# Patient Record
Sex: FEMALE | Race: WHITE | Employment: OTHER | URBAN - METROPOLITAN AREA
[De-identification: names, ages, dates, MRNs, and addresses within clinical notes are randomized per-mention and may not be internally consistent; named-entity substitution may affect disease eponyms.]

---

## 2023-12-28 ENCOUNTER — CLINICAL SUPPORT (OUTPATIENT)
Dept: AUDIOLOGY | Facility: CLINIC | Age: 83
End: 2023-12-28
Payer: MEDICARE

## 2023-12-28 ENCOUNTER — OFFICE VISIT (OUTPATIENT)
Dept: OTOLARYNGOLOGY | Facility: CLINIC | Age: 83
End: 2023-12-28
Payer: MEDICARE

## 2023-12-28 VITALS — BODY MASS INDEX: 31.55 KG/M2 | WEIGHT: 167 LBS

## 2023-12-28 DIAGNOSIS — H90.3 BILATERAL SENSORINEURAL HEARING LOSS: Primary | ICD-10-CM

## 2023-12-28 DIAGNOSIS — H91.13 PRESBYCUSIS OF BOTH EARS: ICD-10-CM

## 2023-12-28 DIAGNOSIS — H90.3 SENSORINEURAL HEARING LOSS, BILATERAL: Primary | ICD-10-CM

## 2023-12-28 PROCEDURE — 1160F RVW MEDS BY RX/DR IN RCRD: CPT | Performed by: OTOLARYNGOLOGY

## 2023-12-28 PROCEDURE — 1159F MED LIST DOCD IN RCRD: CPT | Performed by: OTOLARYNGOLOGY

## 2023-12-28 PROCEDURE — 1036F TOBACCO NON-USER: CPT | Performed by: OTOLARYNGOLOGY

## 2023-12-28 PROCEDURE — 99203 OFFICE O/P NEW LOW 30 MIN: CPT | Performed by: OTOLARYNGOLOGY

## 2023-12-28 PROCEDURE — 92567 TYMPANOMETRY: CPT | Performed by: AUDIOLOGIST

## 2023-12-28 PROCEDURE — 92557 COMPREHENSIVE HEARING TEST: CPT | Performed by: AUDIOLOGIST

## 2023-12-28 ASSESSMENT — ENCOUNTER SYMPTOMS: DEPRESSION: 0

## 2023-12-28 NOTE — PROGRESS NOTES
"AUDIOMETRIC EVALUATION       Name:  Giselle Thomas  :  1940  Age:  83 y.o.  Date of Evaluation:  2023     HISTORY  Giselle Thomas was seen today for a hearing evaluation due to occasional feeling of fluid in the left ear and occasional heart beat when she lies her head down. She had a hearing evaluation in  that indicated bilateral sensorineural hearing loss (see audiogram). Denies change in hearing or hearing difficulties.    Recall from 2020: She reports evaluation by Dr. Rojo, 8 years ago, which he obtained an MRI at that time. She reports the MRI \"showed something\" in the right ear but she does not recall the findings and does not have access to them. She denies pain, drainage, tinnitus. She does have imbalance, syncope in 2020 and dizziness and arm pain in 2020. She reports being evaluated by her cardiologist and reportedly results were unremarkable. She reports some hearing loss     PROCEDURE:  Otoscopic Evaluation:    RIGHT: Clear ear canal and tympanic membrane visualized.  LEFT:  Clear ear canal and tympanic membrane visualized.    Immittance:    Tympanometry (226 Hz probe tone) and Stapedial Acoustic Reflexes Thresholds (ART)(Probe ear):  RIGHT: Normal middle ear pressure, mobility, and ear canal volume. Ipsilateral ART absent 500-2000 Hz.  LEFT: Normal middle ear pressure, mobility, and ear canal volume. Ipsilateral ART present 500-2000 Hz.    Pure Tone and Speech Audiometry:    Test Technique: Pure Tone Audiometry via TDH headphones and insert earphones  Test Reliability: good    RIGHT: Mild to moderately severe sensorineural hearing loss through 8000 Hz.. Word Recognition score was excellent using recorded material (NU-6 10-word list ordered by difficulty).   LEFT:  Mild to moderately severe sensorineural hearing loss through 8000 Hz.. Word Recognition score was excellent using recorded material (NU-6 25-word list).     EVALUATION  See scanned Audiogram in " "\"Media\".    IMPRESSIONS:  Today's test results indicate stable hearing as compared to previous testing. (2020). Mild to moderately severe sensorineural hearing loss through 8000 Hz, bilaterally. Normal middle ear function, bilaterally. Absent ipsilateral stapedial acoustic reflexes right ear.    RECOMMENDATIONS:  Continue medical follow-up with physician.  Return for audiologic assessment in conjunction with otologic care or annually.   Amplification options briefly discussed and literature provided re: hearing aids. Consider amplification pending medical clearance. Check insurance benefit for hearing aid benefits and in-network providers. To schedule an appointment for a hearing aid consultation call [phone].  Implement communication strategies (utilizing visual cues/gestures; reducing background noise and distance from desired source; increasing light to assist with visual cues; use of clear speech).     PATIENT EDUCATION:   Discussed results and recommendations with Giselle Thomas.  Questions were addressed and the patient was encouraged to contact our department ([phone]) should concerns arise.    CONOR Jane, CCC-A  Senior Clinical Audiologist    TIME: [phone]    "

## 2023-12-28 NOTE — PROGRESS NOTES
History of present illness:  Giselle Thomas is a 83 y.o. female presenting today for E/M of hearing loss. She has a history of asymmetrical hearing loss and an MRI that was done 10 years ago which showed no focal  lesions. She is the fiancee of one of my regular patients.  When  she was with her fiance on my last encounter with him, I noticed cerumen impaction.    The patient's current medications, active allergies and list of medical problems were reviewed in the EHR and confirmed electronically there are as follows;    Allergies:   Not on File  Problem list:  There is no problem list on file for this patient.    Current list of medications:   No current outpatient medications on file.     No current facility-administered medications for this visit.     Medical history:  Past Medical History:   Diagnosis Date    Chronic sinusitis, unspecified     Sinusitis    Noninfective gastroenteritis and colitis, unspecified     Colitis    Other conditions influencing health status     Foot pain, unspecified laterality    Other conditions influencing health status     Foot pain, unspecified laterality    Personal history of (healed) traumatic fracture     History of fracture of ankle    Personal history of other specified conditions     History of headache    Pregnant state, incidental     Pregnancy, incidental    Unspecified injury of unspecified wrist, hand and finger(s), initial encounter     Wrist injury     Surgical history:  Past Surgical History:   Procedure Laterality Date    BLADDER SURGERY  07/05/2013    Bladder Surgery    HYSTERECTOMY  07/05/2013    Hysterectomy    OTHER SURGICAL HISTORY  11/11/2021    Cholecystectomy    TUBAL LIGATION  07/05/2013    Tubal Ligation     Family history:  No family history on file.  Social history:       Physical Examination:  CONSTITUTIONAL:  No acute distress  VOICE:  No hoarseness or other abnormality  RESPIRATION:  Breathing comfortably, no stridor  CV:  No clubbing/cyanosis/edema  in hands  EYES:  EOM intact, sclera clear  NEURO:  Alert and oriented times 3, Cranial nerves II-XII grossly intact and symmetric bilaterally  HEAD AND FACE:  Symmetric facial features, no masses or lesions  RIGHT EAR:  Normal external ear and post auricular area, no visible lesions, external auditory canal patent, tympanic membrane intact, no retraction, no signs of mass, effusion, or infection within the middle ear  LEFT EAR: Normal external ear and post auricular area, no visible lesions, external auditory canal patent, tympanic membrane intact, no retraction, no signs of mass, effusion, or infection within the middle ear  NOSE:  Anterior rhinoscopy clear, no significant external deformity.  ORAL CAVITY/OROPHARYNX/LIPS:  normal lining, mobile tongue.  PHARYNGEAL WALLS: Moist mucosal lining, good palatal elevation  NECK/LYMPH:  No LAD, no thyroid masses, trachea midline  SKIN:  Neck and facial skin is without scar or injury  PSYCH:  Alert and oriented with appropriate mood and affect    Diagnostic testing:    Audiometry:  Audiometry testing done 12/28/23 reveals:  Mild sloping to moderate high frequency bilateral Sensorineural hearing loss (SNHL) with excellent speech discrimination      I personally reviewed the available patient's external record and independently reviewed their audiometric testing [and] radiographic imaging through the appropriate viewing software as detailed in my note and agree with the detailed report.      Impression:  Left Cerumen Impaction  Bilateral Sensorineural hearing loss (SNHL)  Presbycusis    Recommendation:  I recommended hearing amplification. She will like to defer at this time. Repeat Hearing testing in two years. Follow-up with PCP and follow-up with me as needed.           Scribe Attestation  By signing my name below, Jia GUY, Scrsanjay   attest that this documentation has been prepared under the direction and in the presence of Roz Blanco MD.

## 2024-03-29 ENCOUNTER — OFFICE VISIT (OUTPATIENT)
Dept: CARDIOLOGY | Facility: CLINIC | Age: 84
End: 2024-03-29
Payer: MEDICARE

## 2024-03-29 VITALS
TEMPERATURE: 98.4 F | WEIGHT: 162 LBS | BODY MASS INDEX: 30.58 KG/M2 | HEIGHT: 61 IN | DIASTOLIC BLOOD PRESSURE: 68 MMHG | SYSTOLIC BLOOD PRESSURE: 146 MMHG

## 2024-03-29 DIAGNOSIS — R55 SYNCOPE, UNSPECIFIED SYNCOPE TYPE: Primary | ICD-10-CM

## 2024-03-29 PROCEDURE — 99213 OFFICE O/P EST LOW 20 MIN: CPT | Performed by: INTERNAL MEDICINE

## 2024-03-29 PROCEDURE — 1036F TOBACCO NON-USER: CPT | Performed by: INTERNAL MEDICINE

## 2024-03-29 PROCEDURE — 1159F MED LIST DOCD IN RCRD: CPT | Performed by: INTERNAL MEDICINE

## 2024-03-29 RX ORDER — LISINOPRIL AND HYDROCHLOROTHIAZIDE 10; 12.5 MG/1; MG/1
1 TABLET ORAL DAILY
COMMUNITY

## 2024-03-29 RX ORDER — CHOLECALCIFEROL (VITAMIN D3) 25 MCG
1000 TABLET ORAL DAILY
COMMUNITY
Start: 2010-09-23

## 2024-03-29 RX ORDER — ASPIRIN 81 MG/1
81 TABLET ORAL NIGHTLY
COMMUNITY
Start: 2008-07-17

## 2024-03-29 RX ORDER — PROPRANOLOL HYDROCHLORIDE 60 MG/1
60 CAPSULE, EXTENDED RELEASE ORAL DAILY
COMMUNITY

## 2024-03-29 RX ORDER — ATORVASTATIN CALCIUM 40 MG/1
40 TABLET, FILM COATED ORAL NIGHTLY
COMMUNITY

## 2024-03-29 RX ORDER — HYDROGEN PEROXIDE 3 %
20 SOLUTION, NON-ORAL MISCELLANEOUS
COMMUNITY

## 2024-03-29 RX ORDER — OMEGA-3 FATTY ACIDS 1000 MG
1000 CAPSULE ORAL 2 TIMES DAILY
COMMUNITY

## 2024-03-29 RX ORDER — POLYETHYLENE GLYCOL 3350 17 G/17G
17 POWDER, FOR SOLUTION ORAL DAILY
COMMUNITY

## 2024-03-29 ASSESSMENT — PATIENT HEALTH QUESTIONNAIRE - PHQ9
1. LITTLE INTEREST OR PLEASURE IN DOING THINGS: SEVERAL DAYS
10. IF YOU CHECKED OFF ANY PROBLEMS, HOW DIFFICULT HAVE THESE PROBLEMS MADE IT FOR YOU TO DO YOUR WORK, TAKE CARE OF THINGS AT HOME, OR GET ALONG WITH OTHER PEOPLE: VERY DIFFICULT
2. FEELING DOWN, DEPRESSED OR HOPELESS: NOT AT ALL
SUM OF ALL RESPONSES TO PHQ9 QUESTIONS 1 AND 2: 1

## 2024-03-29 NOTE — PROGRESS NOTES
Subjective:  The patient is an 84-year-old white female, followed primarily by Dr. Vince Villanueva at [facility], who presents for follow-up of atrial arrhythmias and prior syncope.  She presents today along with her long-term boyfriend Mr. Oleg Teonrio.  The patient suffered syncope in 2020, suspected of perhaps being due to tachycardia-bradycardia syndrome.  She had an event monitor thereafter, showing a PAC burden of 11% and perhaps a very brief spell of atrial fibrillation lasting for just 12 seconds.  A nuclear stress test in 2020 was negative.  The patient has been treated with beta-blockers for her PACs and her hypertension.  She has not suffered any recurrent syncope.    The patient had a spell about 6 weeks ago where she was on a stepstool retrieving an item from her shelves, and she fell backward onto the floor, hitting her head.  She suffered a small laceration to her forehead, which has healed.  She did not undergo any extensive neurological workup.  She saw Dr. Villanueva yesterday, and was sent for a head CT scan that was negative.  Today, she has developed some erythema and swelling below her right eye.    Current Outpatient Medications   Medication Sig    aspirin 81 mg EC tablet Take 1 tablet (81 mg) by mouth once daily at bedtime.    atorvastatin (Lipitor) 40 mg tablet Take 1 tablet (40 mg) by mouth once daily at bedtime.    cholecalciferol (Vitamin D-3) 25 MCG (1000 UT) tablet Take 1 tablet (1,000 Units) by mouth once daily.    esomeprazole (NexIUM) 20 mg DR capsule Take 1 capsule (20 mg) by mouth once daily in the morning. Take before meals. Do not open capsule.    lisinopriL-hydrochlorothiazide 10-12.5 mg tablet Take 1 tablet by mouth once daily.    multivitamin with minerals iron-free (Centrum Silver) Take 1 tablet by mouth once daily.    omega-3 1,000 mg capsule capsule Take 1 capsule (1,000 mg) by mouth 2 times a day.    polyethylene glycol (Glycolax, Miralax) 17 gram packet Take 17 g  "by mouth once daily.    propranolol LA (Inderal LA) 60 mg 24 hr capsule Take 1 capsule (60 mg) by mouth once daily. Do not crush, chew, or split.     Allergies:  Clindamycin     Past Surgical History:   Procedure Laterality Date    BLADDER SURGERY  07/05/2013    Bladder Surgery    HYSTERECTOMY  07/05/2013    Hysterectomy    OTHER SURGICAL HISTORY  11/11/2021    Cholecystectomy    TUBAL LIGATION  07/05/2013    Tubal Ligation     Objective:  Vitals:    03/29/24 1055   BP: 146/68   Temp: 36.9 °C (98.4 °F)   Height:     1.549 m (5' 1\")  Weight: 73.5 kg (162 lb)     Exam:  Gen: Pleasant elderly woman in no distress.  HEENT: Remarkable for infraorbital edema on the right side, with some superficial erythema, suspicious for facial cellulitis.  Neck: No jugular venous distention or thyromegaly.  Lungs: Clear to auscultation, with no wheezes or rales.  Heart: Regular rhythm with no murmurs or gallops noted.  Abdomen: Benign, with no organomegaly or masses.  Extremities: Intact distal pulses; trivial bilateral ankle edema.  Neuro: No focal neurologic abnormalities.  Skin: No cutaneous lesions.    Impressions:  1.  Chronic hypertension, with only fair control on lisinopril/HCTZ and Inderal LA.  2.  Frequent premature atrial complexes with possible brief atrial fibrillation by monitoring in 2020, on beta-blockers for suppression.  3.  Unexplained syncope in 2020, perhaps due to an offset pause.  With any recurrences, we have discussed insertable loop recorder placement.  4.  Other medical problems, including degenerative joint disease, obesity, GERD, lower extremity venous insufficiency, and mild renal insufficiency.  She also has facial erythema suspicious for superficial cellulitis, and was instructed to call Dr. Villanueva's office to report on this.    Recommendations:  1.  The patient will continue her current medical regimen.  2.  She will follow-up again in 1 year.  3.  With any unexplained syncope in the future, she would " likely benefit from placement of an insertable loop recorder to document her rhythm with symptoms.      Daniel Harman MD

## 2025-03-03 ENCOUNTER — APPOINTMENT (OUTPATIENT)
Dept: CARDIOLOGY | Facility: CLINIC | Age: 85
End: 2025-03-03
Payer: MEDICARE

## 2025-03-21 ENCOUNTER — APPOINTMENT (OUTPATIENT)
Dept: CARDIOLOGY | Facility: CLINIC | Age: 85
End: 2025-03-21
Payer: MEDICARE

## 2025-04-19 NOTE — PROGRESS NOTES
"  Patient ID: Giselle Thomas is a 85 y.o. female who presents for Follow-up and Syncope.  History of Present Illness  She is here to establish care as Dr. Harman retired  She is accompanied by her significant other  The patient, with a history of hypertension, presents for follow-up after a recent hospitalization for a hypertensive crisis with a blood pressure of 208. Since then, she has been prescribed lisinopril-hydrochlorothiazide and amlodipine by her primary care physician, Dr. Villanueva. She has been monitoring her blood pressure at home, which has been mostly controlled with occasional systolic readings in the 140s-150s. She also takes propranolol for headaches and an 81mg aspirin daily. She reports a history of syncope approximately five years ago, during which she lost consciousness without warning and woke up on the ground. She denies any symptoms of lightheadedness, dizziness, or near syncope currently.    PMHx:  See list    PSHx:  See list    Social Hx:  Social History[1]    Family Hx:  Family History[2]    Review of Systems   Cardiovascular:  Negative for chest pain, dyspnea on exertion and palpitations.   All other systems reviewed and are negative.        Objective     /71 (BP Location: Right arm, Patient Position: Sitting)   Pulse 68   Ht 1.549 m (5' 1\")   BMI 30.61 kg/m²        LABS:  CBC: No results found for: \"WBC\", \"RBC\", \"HGB\", \"HCT\", \"MCV\", \"MCH\", \"MCHC\", \"RDW\", \"PLT\", \"MPV\"  CBC with Differential:  No results found for: \"WBC\", \"RBC\", \"HGB\", \"HCT\", \"PLT\", \"MCV\", \"MCH\", \"MCHC\", \"RDW\", \"NRBC\", \"SEGSPCT\", \"BANDSPCT\", \"BLASTSPCT\", \"METASPCT\", \"LYMPHOPCT\", \"PROMYELOPCT\", \"MONOPCT\", \"MYELOPCT\", \"EOSPCT\", \"BASOPCT\", \"MONOSABS\", \"LYMPHSABS\", \"EOSABS\", \"BASOSABS\", \"DIFFTYPE\"  CMP:  No results found for: \"NA\", \"K\", \"CL\", \"CO2\", \"BUN\", \"CREATININE\", \"AGRATIO\", \"GLUCOSE\", \"GLU\", \"PROT\", \"CALCIUM\", \"BILITOT\", \"ALKPHOS\", \"AST\", \"ALT\"  BMP:  No results found for: \"NA\", \"K\", \"CL\", \"CO2\", \"BUN\", " "\"CREATININE\", \"CALCIUM\", \"GLUCOSE\", \"GLU\"  Magnesium:No results found for: \"MG\"        Physical Exam  Constitutional:       General: Awake.      Appearance: Normal and healthy appearance. Well-developed and not in distress.   Neck:      Vascular: No JVR. JVD normal.   Pulmonary:      Effort: Pulmonary effort is normal.      Breath sounds: Normal breath sounds. No wheezing. No rhonchi. No rales.   Chest:      Chest wall: Not tender to palpatation.   Cardiovascular:      PMI at left midclavicular line. Normal rate. Regular rhythm. Normal S1. Normal S2.       Murmurs: There is no murmur.      No gallop.  No click. No rub.   Pulses:     Intact distal pulses.   Edema:     Peripheral edema absent.   Abdominal:      Tenderness: There is no abdominal tenderness.   Musculoskeletal: Normal range of motion.         General: No tenderness. Skin:     General: Skin is warm and dry.   Neurological:      General: No focal deficit present.      Mental Status: Alert and oriented to person, place and time.         ECG. See scanned.    Assessment & Plan  Hypertension  Previously uncontrolled, now better controlled with amlodipine and lisinopril hydrochlorothiazide. Systolic mostly in 130s, occasional peaks in 140s-150s. Diastolic normal.  - Continue amlodipine.  - Continue lisinopril hydrochlorothiazide.  - Monitor blood pressure regularly, log bp 1 hour post-medication  - Follow up with Dr. Villanueva for continued blood pressure management.    Aymptomatic Sinus Bradycardia  Heart rate in 50s, likely exacerbated by propranolol. Asymptomatic with no lightheadedness, dizziness, or syncope.  - Monitor heart rate and symptoms.  - Perform ECGs at six-month and twelve-month follow-ups.    Premature Atrial Complexes (PACs)  Occasional PACs noted. Propranolol used for management of PACs and headaches. Magnesium not pursued due to diarrhea risk per patient request.  - Continue propranolol for PACs and headaches.  - Avoid magnesium supplementation at " that time. Defer to PCP to reassess as needed.    Syncope  Single episode five years ago, no recent episodes. Likely related to transient hypotension or bradycardia. Safety measures in place.  - Monitor for recurrence.  - Ensure safety measures, such as stairlift, are in place.    Follow-up  Necessary for cardiovascular health and medication efficacy monitoring. Coordination with Dr. Villanueva essential.  - Schedule follow-up appointments at six months and twelve months. Coordinate with patient's significant other's visits  - Coordinate follow-up with Dr. Villanueva for blood pressure management.    Counseled greater than 50% of the visit.  The patient, her significant other, and I discussed arrhythmia, ECG, shared decision making, bradycardia, medications, declines Mg Ox, blood pressure log,  treatment options, risk, benefits, and imponderables.  Lifestyle modifications reviewed.  All questions answered.  Patient and significant other appreciative of care  Assessment & Plan  Essential hypertension    Mixed hyperlipidemia    Dizziness    Syncope, unspecified syncope type    Encounter to establish care with new doctor    Encounter for medication review and counseling    Encounter to discuss treatment options    Bradycardia      Philomena Frank RN     This medical note was created with the assistance of artificial intelligence (AI) for documentation purposes. The content has been reviewed and confirmed by the healthcare provider for accuracy and completeness. Patient consented to the use of audio recording and use of AI during their visit.     I, , personally performed the services described in the documentation as scribed by the nurse in my presence, and confirm it is both accurate and complete.     Total, 42 inclusive of review of office notes, previous ECG's         [1]   Social History  Socioeconomic History    Marital status:    Tobacco Use    Smoking status: Former     Types: Cigarettes    Smokeless tobacco:  Never   Substance and Sexual Activity    Alcohol use: Never    Drug use: Never    Sexual activity: Defer     Social Drivers of Health     Financial Resource Strain: Low Risk  (6/13/2020)    Received from [facility]    Overall Financial Resource Strain (CARDIA)     Difficulty of Paying Living Expenses: Not very hard   Food Insecurity: No Food Insecurity (6/13/2020)    Received from [facility]    Hunger Vital Sign     Worried About Running Out of Food in the Last Year: Never true     Ran Out of Food in the Last Year: Never true   Transportation Needs: No Transportation Needs (6/13/2020)    Received from [facility]    PRAPARE - Transportation     Lack of Transportation (Medical): No     Lack of Transportation (Non-Medical): No   Stress: No Stress Concern Present (6/13/2020)    Received from [facility]    [employer] - Occupational Stress Questionnaire     Feeling of Stress : Not at all   Social Connections: Moderately Integrated (6/13/2020)    Received from [facility]    Social Connection and Isolation Panel [NHANES]     Frequency of Communication with Friends and Family: More than three times a week     Frequency of Social Gatherings with Friends and Family: Patient declined     Attends Yarsanism Services: 1 to 4 times per year     Active Member of Clubs or Organizations: No     Attends Club or Organization Meetings: Never     Marital Status: Living with partner   Housing Stability: Low Risk  (6/13/2020)    Received from [facility]    Housing Stability Vital Sign     Unable to Pay for Housing in the Last Year: No     Number of Places Lived in the Last Year: 1     Unstable Housing in the Last Year: No   [2]   Family History  Problem Relation Name Age of Onset    Alcohol abuse Mother      Alcohol abuse Father

## 2025-04-21 ENCOUNTER — APPOINTMENT (OUTPATIENT)
Dept: CARDIOLOGY | Facility: CLINIC | Age: 85
End: 2025-04-21
Payer: MEDICARE

## 2025-04-21 VITALS
DIASTOLIC BLOOD PRESSURE: 71 MMHG | SYSTOLIC BLOOD PRESSURE: 139 MMHG | BODY MASS INDEX: 30.61 KG/M2 | HEART RATE: 68 BPM | HEIGHT: 61 IN

## 2025-04-21 DIAGNOSIS — I10 ESSENTIAL HYPERTENSION: ICD-10-CM

## 2025-04-21 DIAGNOSIS — R00.1 BRADYCARDIA: ICD-10-CM

## 2025-04-21 DIAGNOSIS — R55 SYNCOPE, UNSPECIFIED SYNCOPE TYPE: Primary | ICD-10-CM

## 2025-04-21 DIAGNOSIS — R42 DIZZINESS: ICD-10-CM

## 2025-04-21 DIAGNOSIS — E78.2 MIXED HYPERLIPIDEMIA: ICD-10-CM

## 2025-04-21 DIAGNOSIS — Z71.89 ENCOUNTER FOR MEDICATION REVIEW AND COUNSELING: ICD-10-CM

## 2025-04-21 DIAGNOSIS — Z71.89 ENCOUNTER TO DISCUSS TREATMENT OPTIONS: ICD-10-CM

## 2025-04-21 DIAGNOSIS — Z76.89 ENCOUNTER TO ESTABLISH CARE WITH NEW DOCTOR: ICD-10-CM

## 2025-04-21 PROBLEM — N18.30 STAGE 3 CHRONIC KIDNEY DISEASE (MULTI): Status: ACTIVE | Noted: 2020-09-10

## 2025-04-21 PROCEDURE — 1036F TOBACCO NON-USER: CPT | Performed by: INTERNAL MEDICINE

## 2025-04-21 PROCEDURE — 1159F MED LIST DOCD IN RCRD: CPT | Performed by: INTERNAL MEDICINE

## 2025-04-21 PROCEDURE — 99215 OFFICE O/P EST HI 40 MIN: CPT | Performed by: INTERNAL MEDICINE

## 2025-04-21 PROCEDURE — 3075F SYST BP GE 130 - 139MM HG: CPT | Performed by: INTERNAL MEDICINE

## 2025-04-21 PROCEDURE — 3078F DIAST BP <80 MM HG: CPT | Performed by: INTERNAL MEDICINE

## 2025-04-21 PROCEDURE — 93000 ELECTROCARDIOGRAM COMPLETE: CPT | Performed by: INTERNAL MEDICINE

## 2025-04-21 RX ORDER — AMLODIPINE BESYLATE 5 MG/1
5 TABLET ORAL
COMMUNITY
Start: 2025-02-27

## 2025-04-21 ASSESSMENT — ENCOUNTER SYMPTOMS
PALPITATIONS: 0
DYSPNEA ON EXERTION: 0

## 2025-04-21 NOTE — PATIENT INSTRUCTIONS
Continue same medications/treatment.  Patient educated on proper medication use.  Patient educated on risk factor modification.  Please bring any lab results from other providers/physicians to your next appointment.    Please bring all medicines, vitamins, and herbal supplements with you when you come to the office.    Prescriptions will not be filled unless you are compliant with your follow up appointments or have a follow up appointment scheduled as per instruction of your physician. Refills should be requested at the time of your visit.    Follow up with Dr. Mullen in 6 months    RAYNA GUY RN, AM SCRIBING FOR AND IN THE PRESENCE OF DR. WILLIS MULLEN MD, FACC, FACP, RS

## 2025-06-16 ENCOUNTER — APPOINTMENT (OUTPATIENT)
Dept: CARDIOLOGY | Facility: CLINIC | Age: 85
End: 2025-06-16
Payer: MEDICARE

## 2025-07-21 ENCOUNTER — APPOINTMENT (OUTPATIENT)
Dept: CARDIOLOGY | Facility: CLINIC | Age: 85
End: 2025-07-21
Payer: MEDICARE

## 2025-10-06 ENCOUNTER — APPOINTMENT (OUTPATIENT)
Dept: CARDIOLOGY | Facility: CLINIC | Age: 85
End: 2025-10-06
Payer: MEDICARE